# Patient Record
Sex: FEMALE | Race: WHITE | NOT HISPANIC OR LATINO
[De-identification: names, ages, dates, MRNs, and addresses within clinical notes are randomized per-mention and may not be internally consistent; named-entity substitution may affect disease eponyms.]

---

## 2017-06-30 ENCOUNTER — TRANSCRIPTION ENCOUNTER (OUTPATIENT)
Age: 52
End: 2017-06-30

## 2018-01-08 ENCOUNTER — TRANSCRIPTION ENCOUNTER (OUTPATIENT)
Age: 53
End: 2018-01-08

## 2019-07-29 ENCOUNTER — OUTPATIENT (OUTPATIENT)
Dept: OUTPATIENT SERVICES | Facility: HOSPITAL | Age: 54
LOS: 1 days | End: 2019-07-29
Payer: SELF-PAY

## 2019-07-29 ENCOUNTER — APPOINTMENT (OUTPATIENT)
Dept: FAMILY MEDICINE | Facility: HOSPITAL | Age: 54
End: 2019-07-29

## 2019-07-29 VITALS
HEART RATE: 78 BPM | SYSTOLIC BLOOD PRESSURE: 131 MMHG | RESPIRATION RATE: 15 BRPM | TEMPERATURE: 97.9 F | BODY MASS INDEX: 28.59 KG/M2 | OXYGEN SATURATION: 98 % | DIASTOLIC BLOOD PRESSURE: 87 MMHG | HEIGHT: 66.5 IN | WEIGHT: 180 LBS

## 2019-07-29 DIAGNOSIS — F32.9 MAJOR DEPRESSIVE DISORDER, SINGLE EPISODE, UNSPECIFIED: ICD-10-CM

## 2019-07-29 DIAGNOSIS — Z00.00 ENCOUNTER FOR GENERAL ADULT MEDICAL EXAMINATION WITHOUT ABNORMAL FINDINGS: ICD-10-CM

## 2019-07-30 DIAGNOSIS — Z29.9 ENCOUNTER FOR PROPHYLACTIC MEASURES, UNSPECIFIED: ICD-10-CM

## 2019-07-30 PROCEDURE — 83036 HEMOGLOBIN GLYCOSYLATED A1C: CPT

## 2019-07-30 PROCEDURE — 80061 LIPID PANEL: CPT

## 2019-07-30 PROCEDURE — G0463: CPT

## 2019-07-30 PROCEDURE — 82652 VIT D 1 25-DIHYDROXY: CPT

## 2019-07-30 PROCEDURE — 80053 COMPREHEN METABOLIC PANEL: CPT

## 2019-07-30 PROCEDURE — 84443 ASSAY THYROID STIM HORMONE: CPT

## 2019-07-31 NOTE — ASSESSMENT
[FreeTextEntry1] : 54 yo F with hx of thyroid d/o, adrenal nodule, vitiligo, and breast cancer s/p lumpectomy presents for chronic fatigue.

## 2019-07-31 NOTE — HISTORY OF PRESENT ILLNESS
[FreeTextEntry8] : 54 yo F with hx of thyroid d/o, adrenal nodule, vitiligo, and breast cancer (DCIS) s/p lumpectomy (2011) presents c/o fatigue x last 8-9 weeks. Pt has had fatigue for the past few months, worsening, associated with sleepiness, forgetfulness, hair thinning and brittle nails. Pt LMP was 7/3/19 and prior to that was in 01/2019. Pt also reports waking up a night, and denies hot flashes. Pt also reports she has been unable to loose weight and she is the heaviest she has been in her whole. \par \par Pt reports she lost her job at [employer] over a year ago.

## 2019-07-31 NOTE — REVIEW OF SYSTEMS
[Fatigue] : fatigue [Dyspnea on Exertion] : dyspnea on exertion [Constipation] : constipation [Joint Pain] : joint pain [Joint Stiffness] : joint stiffness [Depression] : depression [Fever] : no fever [Chills] : no chills [Hot Flashes] : no hot flashes [Night Sweats] : no night sweats [Vision Problems] : no vision problems [Chest Pain] : no chest pain [Earache] : no earache [Palpitations] : no palpitations [Shortness Of Breath] : no shortness of breath [Wheezing] : no wheezing [Abdominal Pain] : no abdominal pain [Nausea] : no nausea [Vomiting] : no vomiting [Muscle Weakness] : no muscle weakness [Itching] : no itching [Skin Rash] : no skin rash [Headache] : no headache [Dizziness] : no dizziness [Suicidal] : not suicidal [Insomnia] : no insomnia [Anxiety] : no anxiety [FreeTextEntry2] : +weight gain [FreeTextEntry6] : +occasional cough [FreeTextEntry9] : +daily joint pain in hands and ankle,

## 2019-07-31 NOTE — PLAN
[FreeTextEntry1] : \par \par #Health Maintenance\par -Tdap- 2011\par -CBC, CMP, lipids, A1C\par \par #Faitgue\par -TSH ordered\par \par #Depression\par -PHQ-9=12 \par -No suicidal or homicidal ideation\par -Pt did not want social work referral at this time\par \par CPE at next visit in 1 month \par \par Case was d/w Dr. Andrade

## 2019-07-31 NOTE — HEALTH RISK ASSESSMENT
[2 - 4 times a month (2 pts)] : 2-4 times a month (2 points) [de-identified] : occasional ETOH use , but denies desire to drink lately [] : No [de-identified] : does yoga [de-identified] : balanced diet - eats vegetables  [[id number]] : 12

## 2019-08-02 LAB
24R-OH-CALCIDIOL SERPL-MCNC: 48.1 PG/ML
BASOPHILS # BLD AUTO: 0.04 K/UL
BASOPHILS NFR BLD AUTO: 0.7 %
CHOLEST SERPL-MCNC: 199 MG/DL
CHOLEST/HDLC SERPL: 2.7 RATIO
EOSINOPHIL # BLD AUTO: 0.09 K/UL
EOSINOPHIL NFR BLD AUTO: 1.5 %
ESTIMATED AVERAGE GLUCOSE: 114 MG/DL
HBA1C MFR BLD HPLC: 5.6 %
HCT VFR BLD CALC: 41.9 %
HDLC SERPL-MCNC: 73 MG/DL
HGB BLD-MCNC: 13.7 G/DL
IMM GRANULOCYTES NFR BLD AUTO: 0.2 %
LDLC SERPL CALC-MCNC: 111 MG/DL
LYMPHOCYTES # BLD AUTO: 1.53 K/UL
LYMPHOCYTES NFR BLD AUTO: 25.9 %
MAN DIFF?: NORMAL
MCHC RBC-ENTMCNC: 29.4 PG
MCHC RBC-ENTMCNC: 32.7 GM/DL
MCV RBC AUTO: 89.9 FL
MONOCYTES # BLD AUTO: 0.49 K/UL
MONOCYTES NFR BLD AUTO: 8.3 %
NEUTROPHILS # BLD AUTO: 3.74 K/UL
NEUTROPHILS NFR BLD AUTO: 63.4 %
PLATELET # BLD AUTO: 225 K/UL
RBC # BLD: 4.66 M/UL
RBC # FLD: 12.8 %
TRIGL SERPL-MCNC: 77 MG/DL
TSH SERPL-ACNC: 0.92 UIU/ML
WBC # FLD AUTO: 5.9 K/UL

## 2019-08-05 ENCOUNTER — APPOINTMENT (OUTPATIENT)
Dept: FAMILY MEDICINE | Facility: HOSPITAL | Age: 54
End: 2019-08-05

## 2019-08-05 ENCOUNTER — OUTPATIENT (OUTPATIENT)
Dept: OUTPATIENT SERVICES | Facility: HOSPITAL | Age: 54
LOS: 1 days | End: 2019-08-05
Payer: SELF-PAY

## 2019-08-05 VITALS
SYSTOLIC BLOOD PRESSURE: 131 MMHG | HEART RATE: 75 BPM | OXYGEN SATURATION: 99 % | BODY MASS INDEX: 29.09 KG/M2 | RESPIRATION RATE: 15 BRPM | TEMPERATURE: 98 F | DIASTOLIC BLOOD PRESSURE: 88 MMHG | WEIGHT: 183 LBS

## 2019-08-05 DIAGNOSIS — E27.9 DISORDER OF ADRENAL GLAND, UNSPECIFIED: ICD-10-CM

## 2019-08-05 DIAGNOSIS — Z00.00 ENCOUNTER FOR GENERAL ADULT MEDICAL EXAMINATION WITHOUT ABNORMAL FINDINGS: ICD-10-CM

## 2019-08-05 PROCEDURE — 82306 VITAMIN D 25 HYDROXY: CPT

## 2019-08-05 PROCEDURE — 82533 TOTAL CORTISOL: CPT

## 2019-08-05 PROCEDURE — G0463: CPT

## 2019-08-05 PROCEDURE — 85652 RBC SED RATE AUTOMATED: CPT

## 2019-08-05 PROCEDURE — 86200 CCP ANTIBODY: CPT

## 2019-08-05 PROCEDURE — 36415 COLL VENOUS BLD VENIPUNCTURE: CPT

## 2019-08-05 PROCEDURE — 86431 RHEUMATOID FACTOR QUANT: CPT

## 2019-08-05 PROCEDURE — 82024 ASSAY OF ACTH: CPT

## 2019-08-05 NOTE — HISTORY OF PRESENT ILLNESS
[FreeTextEntry1] : f/u fatigue [de-identified] : 52 yo F with PMH of adrenal nodule, depression, fatigue, thyroid d/o, vitiligo, breast cancer and depression presents for f/u fatigue for the past 2 months. Pt reports she has been having fatigue despite getting 8 hours of sleep a night, patient goes to bed sleepy and wakes up sleepy. Pt reports constipation, denies palpitations, denies difficulty keeping her eyes open during the day.

## 2019-08-05 NOTE — REVIEW OF SYSTEMS
[Fatigue] : fatigue [Hot Flashes] : hot flashes [Night Sweats] : night sweats [Fever] : no fever [Chills] : no chills [Chest Pain] : no chest pain [Palpitations] : no palpitations [Shortness Of Breath] : no shortness of breath [Abdominal Pain] : no abdominal pain [Vomiting] : no vomiting [Dysuria] : no dysuria [Headache] : no headache [Dizziness] : no dizziness

## 2019-08-05 NOTE — ASSESSMENT
[FreeTextEntry1] : 52 yo F with PMH of adrenal nodule, depression, fatigue, thyroid d/o, vitiligo, breast cancer and depression presents for f/u fatigue for the past 2 months.

## 2019-08-05 NOTE — END OF VISIT
[] : Resident [FreeTextEntry3] : pt seen with resident and I discussed importance of f/u with  for her depression as this may be the fatigue that she has. if no improvement will consider med. will do vit d level and I also asked her to discuss her fatigue with her oncologist tomorrow at [facility]

## 2019-08-05 NOTE — PLAN
[FreeTextEntry1] : \par \par #Fatigue\par -CBC, and TSH wnl\par -Cortisol, ACTH and Vit D 25 OH today\par -Sleep hygiene reviewed \par -Pt to follow up with Endocrine- referral provided\par \par #Muscle stiffness \par -RF, ESR, anti citrullinated peptide ordered\par \par #Depression\par -Last PHQ-9 was 12\par -Repeat PHQ-9 today - 6\par -SW referral provided\par \par Case was d/w Dr. Andrade \par \par Follow up in 1 month for FIT

## 2019-08-05 NOTE — PHYSICAL EXAM
[No Acute Distress] : no acute distress [Well Developed] : well developed [Normal Oropharynx] : the oropharynx was normal [Thyroid Normal, No Nodules] : the thyroid was normal and there were no nodules present [No Respiratory Distress] : no respiratory distress  [No Accessory Muscle Use] : no accessory muscle use [Normal Rate] : normal rate  [Clear to Auscultation] : lungs were clear to auscultation bilaterally [Regular Rhythm] : with a regular rhythm [Normal S1, S2] : normal S1 and S2 [No Joint Swelling] : no joint swelling [de-identified] : +no pedal edema

## 2019-08-07 DIAGNOSIS — R53.83 OTHER FATIGUE: ICD-10-CM

## 2019-08-07 DIAGNOSIS — M62.89 OTHER SPECIFIED DISORDERS OF MUSCLE: ICD-10-CM

## 2019-08-14 DIAGNOSIS — E55.9 VITAMIN D DEFICIENCY, UNSPECIFIED: ICD-10-CM

## 2019-08-16 PROBLEM — E55.9 VITAMIN D INSUFFICIENCY: Status: ACTIVE | Noted: 2019-08-16

## 2019-08-16 LAB
25(OH)D3 SERPL-MCNC: 23.7 NG/ML
CCP AB SER IA-ACNC: <8 UNITS
CORTIS SERPL-MCNC: 6.3 UG/DL
ERYTHROCYTE [SEDIMENTATION RATE] IN BLOOD BY WESTERGREN METHOD: 18 MM/HR
RF+CCP IGG SER-IMP: NEGATIVE
RHEUMATOID FACT SER QL: <10 IU/ML

## 2019-12-18 NOTE — PHYSICAL EXAM
[No Acute Distress] : no acute distress [Well Developed] : well developed [PERRL] : pupils equal round and reactive to light [EOMI] : extraocular movements intact [Normal Oropharynx] : the oropharynx was normal [No Lymphadenopathy] : no lymphadenopathy [Supple] : supple [No Respiratory Distress] : no respiratory distress  [No Accessory Muscle Use] : no accessory muscle use [Normal Rate] : normal rate  [Clear to Auscultation] : lungs were clear to auscultation bilaterally [Regular Rhythm] : with a regular rhythm [Normal S1, S2] : normal S1 and S2 [No Edema] : there was no peripheral edema [Soft] : abdomen soft [Non Tender] : non-tender [Normal Bowel Sounds] : normal bowel sounds [Normal Supraclavicular Nodes] : no supraclavicular lymphadenopathy [Normal Axillary Nodes] : no axillary lymphadenopathy [Normal Posterior Cervical Nodes] : no posterior cervical lymphadenopathy [Normal Anterior Cervical Nodes] : no anterior cervical lymphadenopathy [No Spinal Tenderness] : no spinal tenderness [No Joint Swelling] : no joint swelling [No Rash] : no rash [2+] : left 2+ [Speech Grossly Normal] : speech grossly normal [Normal Mood] : the mood was normal [de-identified] : +no thyroid nodules palpated Advancement-Rotation Flap Text: The defect edges were debeveled with a #15 scalpel blade.  Given the location of the defect, shape of the defect and the proximity to free margins an advancement-rotation flap was deemed most appropriate.  Using a sterile surgical marker, an appropriate flap was drawn incorporating the defect and placing the expected incisions within the relaxed skin tension lines where possible. The area thus outlined was incised deep to adipose tissue with a #15 scalpel blade.  The skin margins were undermined to an appropriate distance in all directions utilizing iris scissors.

## 2019-12-30 ENCOUNTER — OUTPATIENT (OUTPATIENT)
Dept: OUTPATIENT SERVICES | Facility: HOSPITAL | Age: 54
LOS: 1 days | End: 2019-12-30
Payer: SELF-PAY

## 2019-12-30 ENCOUNTER — APPOINTMENT (OUTPATIENT)
Dept: FAMILY MEDICINE | Facility: HOSPITAL | Age: 54
End: 2019-12-30

## 2019-12-30 VITALS
HEART RATE: 86 BPM | DIASTOLIC BLOOD PRESSURE: 90 MMHG | OXYGEN SATURATION: 94 % | BODY MASS INDEX: 29.09 KG/M2 | WEIGHT: 183 LBS | SYSTOLIC BLOOD PRESSURE: 157 MMHG | RESPIRATION RATE: 15 BRPM

## 2019-12-30 DIAGNOSIS — Z00.00 ENCOUNTER FOR GENERAL ADULT MEDICAL EXAMINATION WITHOUT ABNORMAL FINDINGS: ICD-10-CM

## 2019-12-30 LAB
ALBUMIN SERPL ELPH-MCNC: 4.5 G/DL
ALP BLD-CCNC: 86 U/L
ALT SERPL-CCNC: 13 U/L
ANION GAP SERPL CALC-SCNC: 11 MMOL/L
AST SERPL-CCNC: 10 U/L
BILIRUB SERPL-MCNC: 0.4 MG/DL
BUN SERPL-MCNC: 18 MG/DL
CALCIUM SERPL-MCNC: 9.2 MG/DL
CHLORIDE SERPL-SCNC: 106 MMOL/L
CO2 SERPL-SCNC: 23 MMOL/L
CREAT SERPL-MCNC: 0.6 MG/DL
GLUCOSE SERPL-MCNC: 107 MG/DL
POTASSIUM SERPL-SCNC: 4.2 MMOL/L
PROT SERPL-MCNC: 6.7 G/DL
SODIUM SERPL-SCNC: 140 MMOL/L

## 2019-12-30 PROCEDURE — G0463: CPT

## 2019-12-30 NOTE — END OF VISIT
[] : Resident [FreeTextEntry3] : pt's BP high but resident stated pt coughing, will cont checking on next visit

## 2019-12-30 NOTE — PHYSICAL EXAM
[No Acute Distress] : no acute distress [Normal Sclera/Conjunctiva] : normal sclera/conjunctiva [EOMI] : extraocular movements intact [Supple] : supple [No Respiratory Distress] : no respiratory distress  [Clear to Auscultation] : lungs were clear to auscultation bilaterally [No Accessory Muscle Use] : no accessory muscle use [Regular Rhythm] : with a regular rhythm [Normal Rate] : normal rate  [Soft] : abdomen soft [Normal S1, S2] : normal S1 and S2 [No Murmur] : no murmur heard [Non Tender] : non-tender [Normal Bowel Sounds] : normal bowel sounds [de-identified] : +cobblestoning, no pharyngeal erythema, no tonsillar enlargement or exudates [de-identified] : palpable <1cm right anterior cervical lymph node, nontender

## 2019-12-30 NOTE — HISTORY OF PRESENT ILLNESS
[FreeTextEntry8] : 53 yo female presenting today as walk-in with c/o cough. Pt reports runny nose, cough, postnasal drip since December 11; No fever, myalgia, abdominal pain, nausea or vomiting. +sick contacts at home ( and daughter).Pt reports that her sxs initially improved and then recurred. Pt states trying several; OTC meds (Nyquil, robitussin, aleve cold and flu, saline nasal spray,, also home remedies (lemon-elvin teas) with no improvement in sxs.

## 2019-12-31 DIAGNOSIS — J06.9 ACUTE UPPER RESPIRATORY INFECTION, UNSPECIFIED: ICD-10-CM

## 2020-01-04 ENCOUNTER — APPOINTMENT (OUTPATIENT)
Dept: FAMILY MEDICINE | Facility: HOSPITAL | Age: 55
End: 2020-01-04

## 2020-02-13 ENCOUNTER — APPOINTMENT (OUTPATIENT)
Dept: FAMILY MEDICINE | Facility: HOSPITAL | Age: 55
End: 2020-02-13

## 2020-02-13 ENCOUNTER — OUTPATIENT (OUTPATIENT)
Dept: OUTPATIENT SERVICES | Facility: HOSPITAL | Age: 55
LOS: 1 days | End: 2020-02-13
Payer: SELF-PAY

## 2020-02-13 VITALS
WEIGHT: 184 LBS | BODY MASS INDEX: 29.25 KG/M2 | OXYGEN SATURATION: 95 % | DIASTOLIC BLOOD PRESSURE: 85 MMHG | HEART RATE: 99 BPM | SYSTOLIC BLOOD PRESSURE: 131 MMHG | TEMPERATURE: 97.7 F | RESPIRATION RATE: 15 BRPM

## 2020-02-13 DIAGNOSIS — Z87.39 PERSONAL HISTORY OF OTHER DISEASES OF THE MUSCULOSKELETAL SYSTEM AND CONNECTIVE TISSUE: ICD-10-CM

## 2020-02-13 DIAGNOSIS — Z87.898 PERSONAL HISTORY OF OTHER SPECIFIED CONDITIONS: ICD-10-CM

## 2020-02-13 DIAGNOSIS — J01.10 ACUTE FRONTAL SINUSITIS, UNSPECIFIED: ICD-10-CM

## 2020-02-13 DIAGNOSIS — Z00.00 ENCOUNTER FOR GENERAL ADULT MEDICAL EXAMINATION WITHOUT ABNORMAL FINDINGS: ICD-10-CM

## 2020-02-13 DIAGNOSIS — J06.9 ACUTE UPPER RESPIRATORY INFECTION, UNSPECIFIED: ICD-10-CM

## 2020-02-13 PROCEDURE — G0463: CPT

## 2020-02-13 RX ORDER — BENZONATATE 100 MG/1
100 CAPSULE ORAL 3 TIMES DAILY
Qty: 30 | Refills: 0 | Status: DISCONTINUED | COMMUNITY
Start: 2019-12-30 | End: 2020-02-13

## 2020-02-13 RX ORDER — AMOXICILLIN AND CLAVULANATE POTASSIUM 875; 125 MG/1; MG/1
875-125 TABLET, COATED ORAL
Qty: 14 | Refills: 0 | Status: ACTIVE | COMMUNITY
Start: 2020-02-13 | End: 1900-01-01

## 2020-02-14 NOTE — REVIEW OF SYSTEMS
[Negative] : Psychiatric [Fatigue] : fatigue [Earache] : earache [Nasal Discharge] : nasal discharge [Postnasal Drip] : postnasal drip [Fever] : no fever [Chills] : no chills [Hearing Loss] : no hearing loss [Nosebleed] : no nosebleeds [Sore Throat] : no sore throat

## 2020-02-14 NOTE — HISTORY OF PRESENT ILLNESS
[FreeTextEntry8] : 54 year old F w/ h/x of adrenal nodule presenting to walk-in clinic because she believes she has a sinus infection.\par \par complaining of pain behind eyes x 1 week\par she feels warm-  but unsure if fever vs. menopause\par + post nasal drip with cough\par notes occasional white/ yellow discharge from the nose\par Claritin and flonase with no effect

## 2020-02-14 NOTE — PHYSICAL EXAM
[No Acute Distress] : no acute distress [Well Nourished] : well nourished [Well Developed] : well developed [Normal Sclera/Conjunctiva] : normal sclera/conjunctiva [PERRL] : pupils equal round and reactive to light [EOMI] : extraocular movements intact [Normal Oropharynx] : the oropharynx was normal [Supple] : supple [Normal] : normal rate, regular rhythm, normal S1 and S2 and no murmur heard [Soft] : abdomen soft [Non Tender] : non-tender [Non-distended] : non-distended [No HSM] : no HSM [Normal Bowel Sounds] : normal bowel sounds [Normal Posterior Cervical Nodes] : no posterior cervical lymphadenopathy [Normal Anterior Cervical Nodes] : no anterior cervical lymphadenopathy [Grossly Normal Strength/Tone] : grossly normal strength/tone [Normal Affect] : the affect was normal [Normal Gait] : normal gait [Normal Insight/Judgement] : insight and judgment were intact [de-identified] : mild effusion behind left TM

## 2020-02-18 DIAGNOSIS — J01.10 ACUTE FRONTAL SINUSITIS, UNSPECIFIED: ICD-10-CM

## 2020-03-04 ENCOUNTER — APPOINTMENT (OUTPATIENT)
Dept: FAMILY MEDICINE | Facility: HOSPITAL | Age: 55
End: 2020-03-04

## 2022-06-16 ENCOUNTER — NON-APPOINTMENT (OUTPATIENT)
Age: 57
End: 2022-06-16